# Patient Record
Sex: MALE | Race: BLACK OR AFRICAN AMERICAN
[De-identification: names, ages, dates, MRNs, and addresses within clinical notes are randomized per-mention and may not be internally consistent; named-entity substitution may affect disease eponyms.]

---

## 2019-01-16 ENCOUNTER — HOSPITAL ENCOUNTER (EMERGENCY)
Dept: HOSPITAL 61 - ER | Age: 39
Discharge: HOME | End: 2019-01-16
Payer: COMMERCIAL

## 2019-01-16 VITALS — SYSTOLIC BLOOD PRESSURE: 138 MMHG | DIASTOLIC BLOOD PRESSURE: 84 MMHG

## 2019-01-16 VITALS — HEIGHT: 68 IN | BODY MASS INDEX: 28.04 KG/M2 | WEIGHT: 185 LBS

## 2019-01-16 DIAGNOSIS — G89.29: ICD-10-CM

## 2019-01-16 DIAGNOSIS — M54.41: Primary | ICD-10-CM

## 2019-01-16 DIAGNOSIS — F17.210: ICD-10-CM

## 2019-01-16 PROCEDURE — 99284 EMERGENCY DEPT VISIT MOD MDM: CPT

## 2019-01-16 RX ADMIN — HYDROCODONE BITARTRATE AND ACETAMINOPHEN ONE TAB: 5; 325 TABLET ORAL at 17:14

## 2019-01-16 RX ADMIN — METHOCARBAMOL ONE MG: 750 TABLET ORAL at 17:15

## 2019-01-16 NOTE — PHYS DOC
Past Medical History


Past Medical History:  Other


Additional Past Medical Histor:  CHRONIC BACK PAIN


Past Surgical History:  No Surgical History


Additional Information:  


Cigar daily.


Alcohol Use:  None


Drug Use:  Marijuana





Adult General


Chief Complaint


Chief Complaint:  BACK PAIN OR INJURY





HPI


HPI





38-year-old male presents to ER with complaints of lower back pain which 

started on Friday. Patient reports he went to step up on a step when he had 

sudden onset of lower back pain radiating into his right lower extremity. 

Patient denies any injury. Patient denies any fall. Patient states pain has 

been ongoing since and has been gradually worsening. Patient denies any 

incontinence of bowel or bladder, saddle anesthesia, or change in bowel 

pattern. Patient denies swelling in extremities. Patient states he has been 

taking ibuprofen and tramadol with minimal relief. Patient denies numbness or 

tingling. Patient reports he has been able to walk but does have increased 

pain. Patient denies any previous back surgery or injuries.





Review of Systems


Review of Systems





Constitutional: Denies fever or chills []


Eyes: Denies change in visual acuity, redness, or eye pain []


HENT: Denies nasal congestion or sore throat []


Respiratory: Denies cough or shortness of breath []


Cardiovascular: No additional information not addressed in HPI []


GI: Denies abdominal pain, nausea, vomiting, bloody stools or diarrhea []


: Denies dysuria or hematuria []


Musculoskeletal: Denies back pain or joint pain []


Integument: Denies rash or skin lesions []


Neurologic: Denies headache, focal weakness or sensory changes []


Endocrine: Denies polyuria or polydipsia []





All other systems were reviewed and found to be within normal limits, except as 

documented in this note.





Current Medications


Current Medications





Current Medications








 Medications


  (Trade)  Dose


 Ordered  [facility]  Start Time


 Stop Time Status Last Admin


Dose Admin


 


 Acetaminophen/


 Hydrocodone Bitart


  (Lortab 5/325)  1 tab  1X  ONCE  1/16/19 17:00


 1/16/19 17:01 DC 1/16/19 17:14


1 TAB


 


 Methocarbamol


  (Robaxin)  750 mg  1X  ONCE  1/16/19 17:00


 1/16/19 17:01 DC 1/16/19 17:15


750 MG


 


 Prednisone


  (Prednisone)  50 mg  1X  ONCE  1/16/19 17:00


 1/16/19 17:01 DC 1/16/19 17:15


50 MG











Allergies


Allergies





Allergies








Coded Allergies Type Severity Reaction Last Updated Verified


 


  No Known Drug Allergies    8/24/15 No











Physical Exam


Physical Exam





Constitutional: Well developed, well nourished, no acute distress, non-toxic 

appearance. []


HENT: Normocephalic, atraumatic, bilateral external ears normal, oropharynx 

moist, no oral exudates, nose normal. []


Eyes: PERRLA, EOMI, conjunctiva normal, no discharge. [] 


Neck: Normal range of motion, no tenderness, supple, no stridor. [] 


Cardiovascular:Heart rate regular rhythm, no murmur []


Lungs & Thorax:  Bilateral breath sounds clear to auscultation []


Abdomen: Bowel sounds normal, soft, no tenderness, no masses, no pulsatile 

masses. [] 


Skin: Warm, dry, no erythema, no rash. [] 


Back: No tenderness, no CVA tenderness. [] 


Extremities: No tenderness, no cyanosis, no clubbing, ROM intact, no edema. [] 


Neurologic: Alert and oriented X 3, normal motor function, normal sensory 

function, no focal deficits noted. []


Psychologic: Affect normal, judgement normal, mood normal. []





Current Patient Data


Vital Signs





 Vital Signs








  Date Time  Temp Pulse Resp B/P (MAP) Pulse Ox O2 Delivery O2 Flow Rate FiO2


 


1/16/19 17:14   16  98 Room Air  


 


1/16/19 16:27 98.0 91  138/84 (102)    





 98.0       











EKG


EKG


[]





Radiology/Procedures


Radiology/Procedures


[]





Course & Med Decision Making


Course & Med Decision Making








1800: After PO meds patient reports he has had some improvement in right lower 

back pain and is ambulatory in his room. Patient remains neuro and vascular 

intact in bilateral lower extremities.





Dragon Disclaimer


Dragon Disclaimer


This electronic medical record was generated, in whole or in part, using a 

voice recognition dictation system.





Departure


Departure


Impression:  


 Primary Impression:  


 Back pain


 Additional Impression:  


 Sciatica


Disposition:  01 HOME, SELF-CARE


Condition:  STABLE


Referrals:  


NO PCP (PCP)


Patient Instructions:  Back Pain, Adult, Sciatica





Additional Instructions:  


You can continue to take over-the-counter Tylenol and/or ibuprofen as directed 

on container as needed for pain. If taking the Norco prescription avoid 

additional Tylenol.





Warm compresses or heating pad to affected area every 3-4 hours for 20-30 

minutes at a time. You can try ice packs if this improves the pain also.





If symptoms persist follow-up with primary care physician for reevaluation and 

further care.





Avoid driving if taking the Robaxin and Norco prescription- no alcohol intake 

while on these medications.


Scripts


Hydrocodone/Apap 5-325 (NORCO 5-325 TABLET) 1 Each Tablet


1 TAB PO PRN Q6HRS PRN for PAIN, #8 TAB 0 Refills


   Prov: ABBY TEJADA         1/16/19 


Methocarbamol (ROBAXIN-750) 750 Mg Tablet


1 TAB PO BID PRN for PAIN, #10 TAB 0 Refills


   No driving or drinking alcohol while taking


   Prov: ABBY TEJADA         1/16/19 


Prednisone (PREDNISONE) 50 Mg Tablet


1 TAB PO DAILY, #4 TAB 0 Refills


   Start on 1/17/19


   Prov: ABBY TEJADA         1/16/19





Problem Qualifiers











ABBY TEJADA Jan 16, 2019 17:04